# Patient Record
(demographics unavailable — no encounter records)

---

## 2025-02-24 NOTE — PHYSICAL EXAM
[Normal Coordination] : normal coordination [Normal Sensation] : normal sensation [Normal Mood and Affect] : normal mood and affect [Oriented] : oriented [Able to Communicate] : able to communicate [Well Developed] : well developed [Well Nourished] : well nourished [Left] : left knee [Lateral] : lateral [[place]] : [place] [AP Standing] : anteroposterior standing [5___] : hamstring 5[unfilled]/5 [Mild patellofemoral OA] : Mild patellofemoral OA [] : non-antalgic

## 2025-02-24 NOTE — HISTORY OF PRESENT ILLNESS
[de-identified] : Patient reports an injury to the knee while walking her dog about 3 months ago and a subsequent injury about 1 month ago when she hyperextended the knee when she slipped on the ice.

## 2025-02-24 NOTE — DISCUSSION/SUMMARY
[Medication Risks Reviewed] : Medication risks reviewed [de-identified] : We discussed formal PT, a home exercise program, ice therapy and the role of NSAIDS for the pain. These modalities will improve the patient's functionality in their activities of daily life.  Risks, benefits and contraindications were discussed.  The patient will follow up in 6 weeks or sooner as needed.